# Patient Record
Sex: FEMALE | Race: WHITE | Employment: UNEMPLOYED
[De-identification: names, ages, dates, MRNs, and addresses within clinical notes are randomized per-mention and may not be internally consistent; named-entity substitution may affect disease eponyms.]

---

## 2017-07-08 ENCOUNTER — HEALTH MAINTENANCE LETTER (OUTPATIENT)
Age: 32
End: 2017-07-08

## 2019-10-04 ENCOUNTER — HEALTH MAINTENANCE LETTER (OUTPATIENT)
Age: 34
End: 2019-10-04

## 2019-11-12 DIAGNOSIS — E70.0 CLASSICAL PHENYLKETONURIA (H): ICD-10-CM

## 2019-11-12 RX ORDER — NUT.TX.IMPAIRED DIGESTIVE FXN 16G-451
6 POWDER (GRAM) ORAL DAILY
Qty: 186 PACKET | Refills: 4 | Status: SHIPPED | OUTPATIENT
Start: 2019-11-12

## 2019-11-13 ENCOUNTER — HOME INFUSION (PRE-WILLOW HOME INFUSION) (OUTPATIENT)
Dept: PHARMACY | Facility: CLINIC | Age: 34
End: 2019-11-13

## 2019-11-27 NOTE — PROGRESS NOTES
This is a recent snapshot of the patient's [facility] medical record.  For current drug dose and complete information and questions, call [phone] or In Basket pool, fv home infusion ([id number])  CSN Number:  [id number]

## 2020-04-06 ENCOUNTER — TELEPHONE (OUTPATIENT)
Dept: CONSULT | Facility: CLINIC | Age: 35
End: 2020-04-06

## 2020-05-24 ENCOUNTER — APPOINTMENT (OUTPATIENT)
Dept: LAB | Facility: CLINIC | Age: 35
End: 2020-05-24
Payer: COMMERCIAL

## 2020-05-26 PROCEDURE — 84510 ASSAY OF TYROSINE: CPT | Performed by: PEDIATRICS

## 2020-05-29 LAB
PHE SERPL-MCNC: 11.7 MG/DL (ref 0.5–1.6)
TYROSINE SERPL-MCNC: 1.3 MG/DL (ref 0.6–2.4)

## 2020-06-02 ENCOUNTER — TELEPHONE (OUTPATIENT)
Dept: NUTRITION | Facility: CLINIC | Age: 35
End: 2020-06-02

## 2020-06-08 ENCOUNTER — ALLIED HEALTH/NURSE VISIT (OUTPATIENT)
Dept: PEDIATRICS | Facility: CLINIC | Age: 35
End: 2020-06-08
Attending: DIETITIAN, REGISTERED
Payer: COMMERCIAL

## 2020-06-08 ENCOUNTER — VIRTUAL VISIT (OUTPATIENT)
Dept: PEDIATRICS | Facility: CLINIC | Age: 35
End: 2020-06-08
Attending: PEDIATRICS
Payer: COMMERCIAL

## 2020-06-08 DIAGNOSIS — E70.1 PHENYLKETONURIA (PKU) (H): Primary | ICD-10-CM

## 2020-06-08 PROCEDURE — 97803 MED NUTRITION INDIV SUBSEQ: CPT | Mod: ZF | Performed by: DIETITIAN, REGISTERED

## 2020-06-08 NOTE — PROGRESS NOTES
"Soniya Perdue is a 35 year old female who is being evaluated via a billable video visit.      The patient has been notified of following:     \"This video visit will be conducted via a call between you and your physician/provider. We have found that certain health care needs can be provided without the need for an in-person physical exam.  This service lets us provide the care you need with a video conversation.  If a prescription is necessary we can send it directly to your pharmacy.  If lab work is needed we can place an order for that and you can then stop by our lab to have the test done at a later time.    Video visits are billed at different rates depending on your insurance coverage.  Please reach out to your insurance provider with any questions.    If during the course of the call the physician/provider feels a video visit is not appropriate, you will not be charged for this service.\"    Patient has given verbal consent for Video visit? Yes    How would you like to obtain your AVS? MyChart    Patient would like the video invitation sent by: Send to e-mail at: [email or web address]    Will anyone else be joining your video visit? Shay Saavedra, PharmD and NANDINI Mireles, EMT    Soniya Perdue is a 35 year old female who is being evaluated via a billable video visit.      The patient has been notified of following:     \"This video visit will be conducted via a call between you and your physician/provider. We have found that certain health care needs can be provided without the need for an in-person physical exam.  This service lets us provide the care you need with a video conversation.  If a prescription is necessary we can send it directly to your pharmacy.  If lab work is needed we can place an order for that and you can then stop by our lab to have the test done at a later time.    Video visits are billed at different rates depending on your insurance coverage.  " "Please reach out to your insurance provider with any questions.    If during the course of the call the physician/provider feels a video visit is not appropriate, you will not be charged for this service.\"    Patient has given verbal consent for Video visit? Yes    How would you like to obtain your AVS? Mail a copy    Patient would like the video invitation sent by: Send to e-mail at: [email or web address]    Will anyone else be joining your video visit? Shay Saavedra PharmD and NANDINI Mireles        Video-Visit Details    Type of service:  Video Visit    Video Start Time: 1:02 PM  Video End Time: 1:37 PM    Originating Location (pt. Location): Home    Distant Location (provider location):  [facility] PKU     Platform used for Video Visit: Fabian Craig MD              Phenylketonuria (PKU) Clinic  [facility] [address] [place] [address]  Phone: [phone]  Fax: [phone]  Date: 2020      Patient:  Soniya Perdue   :   1985   MRN:     [id number]      Soniya Perdue  [address]  [facility] [address]    Dear Dr. Myra Ahn and Parents of Soniya Perdue,    CHIEF COMPLAINT:     I had the pleasure of seeing Soniya Perdue in the PKU and Maternal PKU Clinic at the [facility] regarding phenylketonuria (PKU). This here for evaluation and treatment.      Since the last visit, there have been no hospitalizations, emergency department visits, or other major changes in medical care.  Her last Phe level was on May 26, 2020 and was at a value of 11.7 mg/dL and her tyrosine level was 1.3 mg/dL.  She expressed interest in Palynziq and we discussed this medication with her.    PAST MEDICAL HISTORY:    These list of past medical problems includes:    Patient Active Problem List   Diagnosis     Phenylketonuria (PKU) (H)     CARDIOVASCULAR SCREENING; LDL GOAL LESS THAN 160     FAMILY HISTORY: A brief family medical history was " reviewed.  MEDICATIONS:   Current Outpatient Medications   Medication Sig     Calcium Carb-Cholecalciferol (CALCIUM + D3 PO)      drospirenone-ethinyl estradiol (PRECIOUS) 3-0.03 MG per tablet Take 1 tablet by mouth daily     FISH OIL      Nutritional Supplements (LOPHLEX) PACK Take 6 Packages by mouth daily     IRON PO      sapropterin dihydrochloride (KUVAN) 100 MG TBSO START KUVAN.  Take 11 tablets by mouth once daily. Take with food. (Patient not taking: Reported on 6/8/2020)     No current facility-administered medications for this visit.      REVIEW OF SYSTEMS: The review of systems negative for new eye, ear, heart, lung, liver, spleen, gastrointestinal, bone, muscle, integumentary, endocrinologic, brain or psychiatric issues except as noted above.  PHYSICAL EXAMINATION:  Demographics: There were no vitals taken for this visit.  General: The patient is oriented to person, place and time at an age-appropriate manner.   HEENT: The facial features are normal and symmetric.   The gaze is conjugate and extraocular motions are full and intact.The pupils are equal, round and reactive to light.  The ears are of normal position and configuration and hearing is grossly normal.    Neck: The neck  appears to be supple with full range of motion  Chest: The chest is of normal configuration.  Heart: Not examined.  Abdomen: The abdomen appears to be soft and benign without organomegaly.   Extremities: The extremities are of normal configuration.  Integument: The integument is  of normal appearance without significant changes in pigmentation, birthmarks, or lesions.  Neurologic:  Mental Status Exam:  Alert, awake. Fully oriented. No dysarthria, no dysphasia. Speech of normal fluency.  Gait:  She appears to have normal gait; normal arm swing and stance.ankles.    LABORATORY RESULTS: Previous studies showed pathologically elevated blood phenylalanine levels as well as specific PAH mutatons and excluded disorders of biopterin  "recycling. Laboratory studies from the past year were reviewed.    ASSESSMENT:  1.) Phenylketonuria (PKU).  2.) Moderately good control with blood phe levels aiming at levels of 6 mg/dL or lower.     PLAN/RECOMMENDATIONS:    1.) Send blood \"tyrosine and phenylalanine\" levels monthly.  2.) Metabolic PKU Dietician Consult today for regular diet assessment and nutritional management including the patient's prescribed phenylalanine intake.  3.) Continue low phenylalanine diet (250-400 mg phenylalanine/day, or as modified by consultation with the Metabolic PKU Dietician considering recent blood phenylalanine levels, diet history, and impact of sapropterin, if taken).  4.) Return to PKU Clinic in 12 months.    FOLLOW-UP PLAN:  If you are returning to clinic to review specific laboratory tests, please call the Genetic Counselor (see phone numbers below) to confirm that we have received all of the results from reference laboratories prior to your appointment. If we have not received all of the test results, please discuss re-scheduling your appointment.      With warmest regards,     Yves Craig PhD MD  Medical Director, PKU Clinic  Professor of Pediatrics, Medical School, and  Experimental and Clinical Pharmacology, [employer]    Appointments: [phone]      Monday mornings: Advanced Therapies for Lysosomal Diseases Clinic   Monday afternoons: PKU Clinic, Metabolism Clinic, and Genetics Clinic    Pharmacotherapy Consultant:  Taryn Werner, PharmD, Pharmacotherapy for Metabolic Disorders (PIMD): [phone]  Shay Saavedra, PharmD, Pharmacotherapy for Metabolic Disorders (PIMD): [phone]    Genetic Counselor:  Sofia Anaya MS, [facility] (Genetic test Results): [phone]    Metabolic Dietician:  Renee Villela, Registered Dietician: [phone]    Advanced Therapies Clinic Scheduler:  Vivian De Paz, [phone]    Nurse Coordinator, PKU, Metabolism and Genetics:  Mary Acevedo RN, " [phone]    Copies:    Dr. Myra Ahn  [address] [facility] [address]    Soniya Perdue  [address] [address] [address]     No referring provider defined for this encounter.

## 2020-06-08 NOTE — LETTER
2020      RE: Soniya Perdue  [address]  [facility] [address]       Soniya Perdue is a 35 year old female who is being evaluated via a billable video visit.      JAM Okeefe    Soniya Perdue is a 35 year old female who is being evaluated via a billable video visit.          Video-Visit Details    Type of service:  Video Visit    Video Start Time: 1:02 PM  Video End Time: 1:37 PM    Originating Location (pt. Location): Home    Distant Location (provider location):  [facility] PKU     Platform used for Video Visit: Fabian Craig MD              Phenylketonuria (PKU) Clinic  [facility] [address] [facility] [address]  Phone: [phone]  Fax: [phone]  Date: 2020      Patient:  Soniya Perdue   :   1985   MRN:     [id number]      Soniya Perdue  [address]  [facility] [address]    Dear Dr. Myra Ahn and Parents of Soniya Perdue,    CHIEF COMPLAINT:     I had the pleasure of seeing Soniya Perdue in the PKU and Maternal PKU Clinic at the [facility] regarding phenylketonuria (PKU). This here for evaluation and treatment.      Since the last visit, there have been no hospitalizations, emergency department visits, or other major changes in medical care.  Her last Phe level was on May 26, 2020 and was at a value of 11.7 mg/dL and her tyrosine level was 1.3 mg/dL.  She expressed interest in Palynziq and we discussed this medication with her.    PAST MEDICAL HISTORY:    These list of past medical problems includes:    Patient Active Problem List   Diagnosis     Phenylketonuria (PKU) (H)     CARDIOVASCULAR SCREENING; LDL GOAL LESS THAN 160     FAMILY HISTORY: A brief family medical history was reviewed.  MEDICATIONS:   Current Outpatient Medications   Medication Sig     Calcium Carb-Cholecalciferol (CALCIUM + D3 PO)      drospirenone-ethinyl estradiol (PRECIOUS) 3-0.03 MG per  tablet Take 1 tablet by mouth daily     FISH OIL      Nutritional Supplements (LOPHLEX) PACK Take 6 Packages by mouth daily     IRON PO      sapropterin dihydrochloride (KUVAN) 100 MG TBSO START KUVAN.  Take 11 tablets by mouth once daily. Take with food. (Patient not taking: Reported on 6/8/2020)     No current facility-administered medications for this visit.      REVIEW OF SYSTEMS: The review of systems negative for new eye, ear, heart, lung, liver, spleen, gastrointestinal, bone, muscle, integumentary, endocrinologic, brain or psychiatric issues except as noted above.  PHYSICAL EXAMINATION:  Demographics: There were no vitals taken for this visit.  General: The patient is oriented to person, place and time at an age-appropriate manner.   HEENT: The facial features are normal and symmetric.   The gaze is conjugate and extraocular motions are full and intact.The pupils are equal, round and reactive to light.  The ears are of normal position and configuration and hearing is grossly normal.    Neck: The neck  appears to be supple with full range of motion  Chest: The chest is of normal configuration.  Heart: Not examined.  Abdomen: The abdomen appears to be soft and benign without organomegaly.   Extremities: The extremities are of normal configuration.  Integument: The integument is  of normal appearance without significant changes in pigmentation, birthmarks, or lesions.  Neurologic:  Mental Status Exam:  Alert, awake. Fully oriented. No dysarthria, no dysphasia. Speech of normal fluency.  Gait:  She appears to have normal gait; normal arm swing and stance.ankles.    LABORATORY RESULTS: Previous studies showed pathologically elevated blood phenylalanine levels as well as specific PAH mutatons and excluded disorders of biopterin recycling. Laboratory studies from the past year were reviewed.    ASSESSMENT:  1.) Phenylketonuria (PKU).  2.) Moderately good control with blood phe levels aiming at levels of 6 mg/dL or  "lower.     PLAN/RECOMMENDATIONS:    1.) Send blood \"tyrosine and phenylalanine\" levels monthly.  2.) Metabolic PKU Dietician Consult today for regular diet assessment and nutritional management including the patient's prescribed phenylalanine intake.  3.) Continue low phenylalanine diet (250-400 mg phenylalanine/day, or as modified by consultation with the Metabolic PKU Dietician considering recent blood phenylalanine levels, diet history, and impact of sapropterin, if taken).  4.) Return to PKU Clinic in 12 months.    FOLLOW-UP PLAN:  If you are returning to clinic to review specific laboratory tests, please call the Genetic Counselor (see phone numbers below) to confirm that we have received all of the results from reference laboratories prior to your appointment. If we have not received all of the test results, please discuss re-scheduling your appointment.      With warmest regards,     Yves Craig PhD MD  Medical Director, PKU Clinic  Professor of Pediatrics, Medical School, and  Experimental and Clinical Pharmacology, [employer]    Appointments: [phone]      Monday mornings: [facility]rapies for Lysosomal Diseases Clinic   Monday afternoons: PKU Clinic, Metabolism Clinic, and Genetics Clinic    Pharmacotherapy Consultant:  Taryn Werner, PharmD, Pharmacotherapy for Metabolic Disorders (PIMD): [phone]  Shay Saavedra, PharmD, Pharmacotherapy for Metabolic Disorders (PIMD): [phone]    Genetic Counselor:  Sofia Anaya MS, [facility] (Genetic test Results): [phone]    Metabolic Dietician:  Renee Villela, Registered Dietician: [phone]    Advanced Therapies Clinic Scheduler:  Vivian De Paz, [phone]    Nurse Coordinator, PKU, Metabolism and Genetics:  Mary Acevedo RN, [phone]    Copies:    Dr. Myra Ahn  [id number]  [id number]    Soniya Perdue  [address] [address] [address]    Dr. Choi referring provider " defined for this encounter.    Reginald Craig MD

## 2020-06-23 NOTE — PROGRESS NOTES
"..Soniya Perdue is a 35 year old female who is being evaluated via a billable telephone visit.      The patient has been notified of following:     \"This telephone visit will be conducted via a call between you and your physician/provider. We have found that certain health care needs can be provided without the need for a physical exam.  This service lets us provide the care you need with a short phone conversation.  If a prescription is necessary we can send it directly to your pharmacy.  If lab work is needed we can place an order for that and you can then stop by our lab to have the test done at a later time.    Telephone visits are billed at different rates depending on your insurance coverage. During this emergency period, for some insurers they may be billed the same as an in-person visit.  Please reach out to your insurance provider with any questions.    If during the course of the call the physician/provider feels a telephone visit is not appropriate, you will not be charged for this service.\"    Patient has given verbal consent for Telephone visit?  Yes    What phone number would you like to be contacted at? [phone]    How would you like to obtain your AVS? Mail a copy    Phone call duration: 15 minutes    CLINICAL NUTRITION SERVICES - PEDIATRIC ASSESSMENT NOTE     REASON FOR ASSESSMENT  Soniya Perdue is a 35 year old female seen by the dietitian for consult regarding PKU.     ANTHROPOMETRICS  Height: 162 cm or 63.7  in  Weight: 52.2 kg or 115 lbs  BMI: 20     NUTRITION HISTORY  Patient follows a low protein diet (goal 8 grams/day or 400 mg PHE).      Usual/typical intake:     Breakfast - typically will only have some formula and a bit later, coffee  Lunch - coconut milk yogurt + fruit + salad, or sometimes leftover dinner such as pasta + vegan feta cheese, olives, cucumbers,  Dinner - typically low pro pasta or rice with veggies     Highest items patient consumes are items such as " "naches, veggie tacos, and \"carb\" foods (potatoes, crackers, etc).  Patient notes her most recent level was after a weekend at the cabin so was a lot of starchy cabin food. Orders mainly from [employer].    Social History:  Patient was previously followed in our clinic until she relocated to [place] for several years.  She is re-establishing PKU care here after relocated back to [place].     PKU FORMULA  6 pkts of Lophlex daily     This is providing 246 kcals (5 kcal/kg), 60 grams protein (1.2 g/kg), 497 IU Vitamin D, 1776 mg calcium, 22 mg iron. Takes divided 4x throughout the day.    Obtains formula from:  I     LABS  Labs reviewed;                PHE     TYR  5/2020  11.7 1.3   4/2019  7.4 0.8  (via [facility])    -States typical blood phe range for her as 7-11 mg/dL.    MEDICATIONS  Medications reviewed;  -MVI daily  -calcium/Vit D (unsure of dose)  -fish oil daily  -probiotic daily     ASSESSED NUTRITION NEEDS:  Estimated Energy Needs: 30-35 kcal/kg  Estimated Protein Needs: RDA for age = 0.8 g/kg, optimal range 0.8-1.2 g/kg  Estimated PHE Needs: typically < 10 mg/kg/day  Micronutrient Needs: 600 IU Vitamin D, 1000 mg calcium, 18 mg iron     NUTRITION DIAGNOSIS:  Impaired nutrient utilization related to diagnosis of PKU as evidenced by elevated serum PHE levels.     INTERVENTIONS  Nutrition Prescription  Meet 100% estimated kcal, protein, PHE, vitamin/mineral needs through low protein diet + PKU formula.    Nutrition Education:   Provided education on continuing current low protein diet + PKU formula.    Reviewed weight/changes, intake, and most recent PHE levels.  -Continue goal of <8 gm protein daily + 6 pkts Lophlex/day  -Aim for submitting levels once/month.  Patient has enough testing kits at home right now.  -Contact info provided for reaching as needed     Goals  1. Weight maintenance  2. Meet >85% estimated nutrition needs through low protein diet + PKU formula  3. PHE levels 2-6 " mg/dL    FOLLOW UP/MONITORING  Energy Intake  Macronutrient intake  Anthropometric measurements     Renee Villela, RD, CSP, LD

## 2020-07-27 ENCOUNTER — HOME INFUSION (PRE-WILLOW HOME INFUSION) (OUTPATIENT)
Dept: PHARMACY | Facility: CLINIC | Age: 35
End: 2020-07-27

## 2020-08-13 PROCEDURE — 84510 ASSAY OF TYROSINE: CPT | Performed by: PEDIATRICS

## 2020-08-27 NOTE — PROGRESS NOTES
This is a recent snapshot of the patient's [facility] medical record.  For current drug dose and complete information and questions, call [phone] or In Basket pool, fv home infusion ([id number])  CSN Number:  [id number]

## 2020-10-20 LAB
PHE SERPL-MCNC: 9.2 MG/DL (ref 0.5–1.6)
TYROSINE SERPL-MCNC: 1.2 MG/DL (ref 0.6–2.4)

## 2020-11-14 ENCOUNTER — HEALTH MAINTENANCE LETTER (OUTPATIENT)
Age: 35
End: 2020-11-14

## 2021-04-30 PROCEDURE — 84510 ASSAY OF TYROSINE: CPT | Performed by: PEDIATRICS

## 2021-05-12 LAB
PHE SERPL-MCNC: 11.5 MG/DL (ref 0.5–1.6)
TYROSINE SERPL-MCNC: 1.6 MG/DL (ref 0.6–2.4)

## 2021-05-17 ENCOUNTER — TELEPHONE (OUTPATIENT)
Dept: NUTRITION | Facility: CLINIC | Age: 36
End: 2021-05-17

## 2021-06-21 ENCOUNTER — HOME INFUSION (PRE-WILLOW HOME INFUSION) (OUTPATIENT)
Dept: PHARMACY | Facility: CLINIC | Age: 36
End: 2021-06-21

## 2021-07-06 NOTE — PROGRESS NOTES
This is a recent snapshot of the patient's [facility] medical record.  For current drug dose and complete information and questions, call [phone] or In [facility] pool, fv home infusion ([id number])  CSN Number:  [id number]

## 2021-09-12 ENCOUNTER — HEALTH MAINTENANCE LETTER (OUTPATIENT)
Age: 36
End: 2021-09-12

## 2021-10-08 ENCOUNTER — HOME INFUSION (PRE-WILLOW HOME INFUSION) (OUTPATIENT)
Dept: PHARMACY | Facility: CLINIC | Age: 36
End: 2021-10-08

## 2021-10-24 NOTE — PROGRESS NOTES
This is a recent snapshot of the patient's [facility] medical record.  For current drug dose and complete information and questions, call [phone] or In Basket pool, fv home infusion ([id number])  CSN Number:  [id number]

## 2022-01-02 ENCOUNTER — HEALTH MAINTENANCE LETTER (OUTPATIENT)
Age: 37
End: 2022-01-02

## 2022-01-14 ENCOUNTER — TELEPHONE (OUTPATIENT)
Dept: CONSULT | Facility: CLINIC | Age: 37
End: 2022-01-14

## 2022-01-14 NOTE — TELEPHONE ENCOUNTER
I left Soniya a voice message about setting a follow-up virtual visit in the PKU clinic to meet with Dr. Craig for this following year. Soniya is due for her annual visit with Dr. Craig.  Dr. Craig would like to follow-up on how the Soniya's progress is coming along. Please feel free to contact Dr. Craig s coordinator Vivian at [phone] or the Genetics appointment scheduling at [phone] and we ll be happy to help coordinate an appointment with the patient.       Thank you,   Vivian De Paz

## 2022-04-04 ENCOUNTER — TELEPHONE (OUTPATIENT)
Dept: CONSULT | Facility: CLINIC | Age: 37
End: 2022-04-04

## 2022-04-04 NOTE — TELEPHONE ENCOUNTER
This is my second attempt on trying to reach Soniya. Soniya is over due for her annual visit in the PKU clinic to meet with Dr. Craig. Soniya can reach me at [phone] and I can help her get schedule for a follow up virtual visit in the PKU clinic.       Thank you,   Vivian De Paz

## 2022-10-24 ENCOUNTER — DOCUMENTATION ONLY (OUTPATIENT)
Dept: ONCOLOGY | Facility: CLINIC | Age: 37
End: 2022-10-24

## 2022-10-24 NOTE — PROGRESS NOTES
Kuvan START protocol: past results and interpretation    patient's weight (kg): 55  patient's Kuvan daily dose (mg): 1100  Kuvan/sapropterin dose (mg/kg/day): 20  Begin START Protocol: 02/15/2015  End START Protocol: 03/15/2015  Date of last Phe level: 03/15/2015    Unblinding:  Week 1: Kuvan Week 2: Placebo Week 3: Kuvan Week 4: Placebo    Lab Results:   Lab Results   Component Value Date    PHENYL 9.8 (H) 03/15/2015    PHENYL 8.4 (H) 03/08/2015    PHENYL 8.5 (H) 03/01/2015    PHENYL 11.0 (H) 02/22/2015    PHENYL 15.3 (H) 02/15/2015       Per the clinic, the result of this patient's START protocol was: non-responder    Efrain Bingham, PharmD  Pharmacy Resident  [facility]

## 2022-11-19 ENCOUNTER — HEALTH MAINTENANCE LETTER (OUTPATIENT)
Age: 37
End: 2022-11-19

## 2023-04-09 ENCOUNTER — HEALTH MAINTENANCE LETTER (OUTPATIENT)
Age: 38
End: 2023-04-09

## 2024-06-15 ENCOUNTER — HEALTH MAINTENANCE LETTER (OUTPATIENT)
Age: 39
End: 2024-06-15